# Patient Record
(demographics unavailable — no encounter records)

---

## 2025-03-12 NOTE — PHYSICAL EXAM
[General Appearance - Alert] : alert [Oriented To Time, Place, And Person] : oriented to person, place, and time [Person] : oriented to person [Place] : oriented to place [Time] : oriented to time [Motor Tone] : muscle tone was normal in all four extremities [Motor Strength] : muscle strength was normal in all four extremities [Abnormal Walk] : normal gait [Balance] : balance was intact [2+] : Patella left 2+
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
## 2025-03-18 NOTE — ASSESSMENT
[FreeTextEntry1] : CT cervical shows solid fusion.  CT lumbar shows a solid fusion.  Plan:  Referral for functional medicine program [facility]